# Patient Record
Sex: MALE | Race: WHITE
[De-identification: names, ages, dates, MRNs, and addresses within clinical notes are randomized per-mention and may not be internally consistent; named-entity substitution may affect disease eponyms.]

---

## 2018-04-16 ENCOUNTER — HOSPITAL ENCOUNTER (EMERGENCY)
Dept: HOSPITAL 88 - ER | Age: 53
Discharge: HOME | End: 2018-04-16
Payer: COMMERCIAL

## 2018-04-16 VITALS — HEIGHT: 69 IN | BODY MASS INDEX: 42.95 KG/M2 | WEIGHT: 290 LBS

## 2018-04-16 DIAGNOSIS — M54.2: Primary | ICD-10-CM

## 2018-04-16 DIAGNOSIS — M54.12: ICD-10-CM

## 2018-04-16 PROCEDURE — 72050 X-RAY EXAM NECK SPINE 4/5VWS: CPT

## 2018-04-16 PROCEDURE — 99283 EMERGENCY DEPT VISIT LOW MDM: CPT

## 2018-04-16 PROCEDURE — 3E0233Z INTRODUCTION OF ANTI-INFLAMMATORY INTO MUSCLE, PERCUTANEOUS APPROACH: ICD-10-PCS

## 2018-04-16 PROCEDURE — 20552 NJX 1/MLT TRIGGER POINT 1/2: CPT

## 2018-04-16 NOTE — DIAGNOSTIC IMAGING REPORT
PROCEDURE:C-SPINE COMPLETE

 

COMPARISON:None.

 

INDICATIONS:CERVICAL SPINE PAIN

 

FINDINGS:

The cervical spine is visualized from the skull base to the bottom of 

C6 on the lateral radiograph, but the cervicothoracic junction is shown 

to be intact on the swimmer's radiograph. No acute, displaced fracture 

or subluxation. Soft tissue, ligamentous, and spinal cord abnormalities 

cannot be excluded on the basis of plain radiography.

 

Multilevel disc space narrowing and marginal disc osteophyte complexes 

affecting C2-3 through C6-C7. Alignment is otherwise within normal 

limits. At least mild narrowing of the left C3-C4 neural foramen is 

suspected on the oblique radiographs. Foramina are otherwise patent. 

Prevertebral soft tissues are of normal thickness.

 

 

CONCLUSION:

No acute osseous abnormality. Soft tissue, ligamentous, and spinal cord 

abnormalities cannot be excluded on the basis of plain radiography.

 

Multilevel degenerative disc changes of the cervical spine extending 

from C2-3 through C6-7 as above. Suspected left C3-C4 foraminal 

narrowing may be further evaluated by non-emergent MRI of the cervical 

spine without contrast, particularly if there is clinical concern for 

radiculopathy. 

 

Dictated by:  Vasiliy Root M.D. on 4/16/2018 at 10:39     

Electronically approved by:  Vasiliy Root M.D. on 4/16/2018 at 10:39

## 2022-01-10 ENCOUNTER — HOSPITAL ENCOUNTER (OUTPATIENT)
Dept: HOSPITAL 88 - US | Age: 57
End: 2022-01-10
Attending: INTERNAL MEDICINE
Payer: COMMERCIAL

## 2022-01-10 DIAGNOSIS — Z12.11: Primary | ICD-10-CM

## 2022-01-10 DIAGNOSIS — R68.81: ICD-10-CM

## 2022-01-10 DIAGNOSIS — R19.06: ICD-10-CM

## 2022-01-10 DIAGNOSIS — R10.10: ICD-10-CM

## 2022-01-10 LAB
BASOPHILS # BLD AUTO: 0 10*3/UL (ref 0–0.1)
BASOPHILS NFR BLD AUTO: 0.4 % (ref 0–1)
DEPRECATED NEUTROPHILS # BLD AUTO: 7.9 10*3/UL (ref 2.1–6.9)
EOSINOPHIL # BLD AUTO: 0.3 10*3/UL (ref 0–0.4)
EOSINOPHIL NFR BLD AUTO: 2.5 % (ref 0–6)
ERYTHROCYTE [DISTWIDTH] IN CORD BLOOD: 13.6 % (ref 11.7–14.4)
HCT VFR BLD AUTO: 41 % (ref 38.2–49.6)
HGB BLD-MCNC: 12.8 G/DL (ref 14–18)
LYMPHOCYTES # BLD: 2.1 10*3/UL (ref 1–3.2)
LYMPHOCYTES NFR BLD AUTO: 19.5 % (ref 18–39.1)
MCH RBC QN AUTO: 29.2 PG (ref 28–32)
MCHC RBC AUTO-ENTMCNC: 31.2 G/DL (ref 31–35)
MCV RBC AUTO: 93.6 FL (ref 81–99)
MONOCYTES # BLD AUTO: 0.6 10*3/UL (ref 0.2–0.8)
MONOCYTES NFR BLD AUTO: 5.4 % (ref 4.4–11.3)
NEUTS SEG NFR BLD AUTO: 71.4 % (ref 38.7–80)
PLATELET # BLD AUTO: 315 X10E3/UL (ref 140–360)
RBC # BLD AUTO: 4.38 X10E6/UL (ref 4.3–5.7)

## 2022-01-10 PROCEDURE — 76700 US EXAM ABDOM COMPLETE: CPT

## 2022-01-12 ENCOUNTER — HOSPITAL ENCOUNTER (OUTPATIENT)
Dept: HOSPITAL 88 - OR | Age: 57
Discharge: HOME | End: 2022-01-12
Attending: INTERNAL MEDICINE
Payer: COMMERCIAL

## 2022-01-12 ENCOUNTER — HOSPITAL ENCOUNTER (EMERGENCY)
Dept: HOSPITAL 88 - ER | Age: 57
Discharge: HOME | End: 2022-01-12
Payer: COMMERCIAL

## 2022-01-12 VITALS — DIASTOLIC BLOOD PRESSURE: 90 MMHG | SYSTOLIC BLOOD PRESSURE: 167 MMHG

## 2022-01-12 VITALS — HEIGHT: 69 IN | WEIGHT: 290 LBS | BODY MASS INDEX: 42.95 KG/M2

## 2022-01-12 VITALS — SYSTOLIC BLOOD PRESSURE: 139 MMHG | DIASTOLIC BLOOD PRESSURE: 95 MMHG

## 2022-01-12 DIAGNOSIS — I10: ICD-10-CM

## 2022-01-12 DIAGNOSIS — Z95.5: ICD-10-CM

## 2022-01-12 DIAGNOSIS — K57.90: ICD-10-CM

## 2022-01-12 DIAGNOSIS — F17.210: ICD-10-CM

## 2022-01-12 DIAGNOSIS — R10.13: Primary | ICD-10-CM

## 2022-01-12 DIAGNOSIS — Z71.6: ICD-10-CM

## 2022-01-12 DIAGNOSIS — Z01.812: ICD-10-CM

## 2022-01-12 DIAGNOSIS — I25.10: ICD-10-CM

## 2022-01-12 DIAGNOSIS — J44.9: ICD-10-CM

## 2022-01-12 DIAGNOSIS — I25.2: ICD-10-CM

## 2022-01-12 DIAGNOSIS — Z79.82: ICD-10-CM

## 2022-01-12 DIAGNOSIS — K57.30: ICD-10-CM

## 2022-01-12 DIAGNOSIS — K22.5: ICD-10-CM

## 2022-01-12 DIAGNOSIS — R73.9: ICD-10-CM

## 2022-01-12 DIAGNOSIS — Z20.822: ICD-10-CM

## 2022-01-12 DIAGNOSIS — K31.89: ICD-10-CM

## 2022-01-12 DIAGNOSIS — K80.20: ICD-10-CM

## 2022-01-12 DIAGNOSIS — K44.9: ICD-10-CM

## 2022-01-12 DIAGNOSIS — K63.5: ICD-10-CM

## 2022-01-12 DIAGNOSIS — K20.90: ICD-10-CM

## 2022-01-12 DIAGNOSIS — Z12.11: Primary | ICD-10-CM

## 2022-01-12 DIAGNOSIS — K29.70: ICD-10-CM

## 2022-01-12 DIAGNOSIS — Z86.73: ICD-10-CM

## 2022-01-12 DIAGNOSIS — E66.01: ICD-10-CM

## 2022-01-12 DIAGNOSIS — I45.10: ICD-10-CM

## 2022-01-12 DIAGNOSIS — Z79.899: ICD-10-CM

## 2022-01-12 DIAGNOSIS — K64.8: ICD-10-CM

## 2022-01-12 DIAGNOSIS — Z01.810: ICD-10-CM

## 2022-01-12 LAB
ALBUMIN SERPL-MCNC: 4 G/DL (ref 3.5–5)
ALBUMIN/GLOB SERPL: 1 {RATIO} (ref 0.8–2)
ALP SERPL-CCNC: 139 IU/L (ref 40–150)
ALT SERPL-CCNC: 21 IU/L (ref 0–55)
ANION GAP SERPL CALC-SCNC: 12.1 MMOL/L (ref 8–16)
BACTERIA URNS QL MICRO: (no result) /HPF
BASOPHILS # BLD AUTO: 0.1 10*3/UL (ref 0–0.1)
BASOPHILS NFR BLD AUTO: 0.4 % (ref 0–1)
BUN SERPL-MCNC: 11 MG/DL (ref 7–26)
BUN/CREAT SERPL: 14 (ref 6–25)
CALCIUM SERPL-MCNC: 9.8 MG/DL (ref 8.4–10.2)
CHLORIDE SERPL-SCNC: 104 MMOL/L (ref 98–107)
CK MB SERPL-MCNC: 2 NG/ML (ref 0–5)
CK SERPL-CCNC: 96 IU/L (ref 30–200)
CLARITY UR: CLEAR
CO2 SERPL-SCNC: 26 MMOL/L (ref 22–29)
COLOR UR: YELLOW
DEPRECATED NEUTROPHILS # BLD AUTO: 14.3 10*3/UL (ref 2.1–6.9)
DEPRECATED RBC URNS MANUAL-ACNC: (no result) /HPF (ref 0–5)
EGFRCR SERPLBLD CKD-EPI 2021: 100 ML/MIN (ref 60–?)
EOSINOPHIL # BLD AUTO: 0.1 10*3/UL (ref 0–0.4)
EOSINOPHIL NFR BLD AUTO: 0.5 % (ref 0–6)
EPI CELLS URNS QL MICRO: (no result) /LPF
ERYTHROCYTE [DISTWIDTH] IN CORD BLOOD: 13.6 % (ref 11.7–14.4)
GLOBULIN PLAS-MCNC: 4 G/DL (ref 2.3–3.5)
GLUCOSE SERPLBLD-MCNC: 130 MG/DL (ref 74–118)
HCT VFR BLD AUTO: 43.1 % (ref 38.2–49.6)
HGB BLD-MCNC: 13.8 G/DL (ref 14–18)
KETONES UR QL STRIP.AUTO: (no result)
LEUKOCYTE ESTERASE UR QL STRIP.AUTO: NEGATIVE
LYMPHOCYTES # BLD: 1.2 10*3/UL (ref 1–3.2)
LYMPHOCYTES NFR BLD AUTO: 7.1 % (ref 18–39.1)
MCH RBC QN AUTO: 29.5 PG (ref 28–32)
MCHC RBC AUTO-ENTMCNC: 32 G/DL (ref 31–35)
MCV RBC AUTO: 92.1 FL (ref 81–99)
MONOCYTES # BLD AUTO: 0.6 10*3/UL (ref 0.2–0.8)
MONOCYTES NFR BLD AUTO: 3.7 % (ref 4.4–11.3)
NEUTS SEG NFR BLD AUTO: 87.4 % (ref 38.7–80)
NITRITE UR QL STRIP.AUTO: NEGATIVE
PLATELET # BLD AUTO: 361 X10E3/UL (ref 140–360)
POTASSIUM SERPL-SCNC: 4.1 MMOL/L (ref 3.5–5.1)
PROT UR QL STRIP.AUTO: NEGATIVE
RBC # BLD AUTO: 4.68 X10E6/UL (ref 4.3–5.7)
SODIUM SERPL-SCNC: 138 MMOL/L (ref 136–145)
SP GR UR STRIP: 1.01 (ref 1.01–1.02)
UROBILINOGEN UR STRIP-MCNC: 0.2 MG/DL (ref 0.2–1)
WBC #/AREA URNS HPF: (no result) /HPF (ref 0–5)

## 2022-01-12 PROCEDURE — 80053 COMPREHEN METABOLIC PANEL: CPT

## 2022-01-12 PROCEDURE — 45384 COLONOSCOPY W/LESION REMOVAL: CPT

## 2022-01-12 PROCEDURE — 85025 COMPLETE CBC W/AUTO DIFF WBC: CPT

## 2022-01-12 PROCEDURE — 82550 ASSAY OF CK (CPK): CPT

## 2022-01-12 PROCEDURE — 82553 CREATINE MB FRACTION: CPT

## 2022-01-12 PROCEDURE — 45385 COLONOSCOPY W/LESION REMOVAL: CPT

## 2022-01-12 PROCEDURE — 36415 COLL VENOUS BLD VENIPUNCTURE: CPT

## 2022-01-12 PROCEDURE — 99284 EMERGENCY DEPT VISIT MOD MDM: CPT

## 2022-01-12 PROCEDURE — 84484 ASSAY OF TROPONIN QUANT: CPT

## 2022-01-12 PROCEDURE — 71260 CT THORAX DX C+: CPT

## 2022-01-12 PROCEDURE — 45380 COLONOSCOPY AND BIOPSY: CPT

## 2022-01-12 PROCEDURE — 93005 ELECTROCARDIOGRAM TRACING: CPT

## 2022-01-12 PROCEDURE — 45378 DIAGNOSTIC COLONOSCOPY: CPT

## 2022-01-12 PROCEDURE — 74177 CT ABD & PELVIS W/CONTRAST: CPT

## 2022-01-12 PROCEDURE — 43239 EGD BIOPSY SINGLE/MULTIPLE: CPT

## 2022-01-12 PROCEDURE — 83880 ASSAY OF NATRIURETIC PEPTIDE: CPT

## 2022-01-12 PROCEDURE — 81001 URINALYSIS AUTO W/SCOPE: CPT
